# Patient Record
Sex: FEMALE | Race: OTHER
[De-identification: names, ages, dates, MRNs, and addresses within clinical notes are randomized per-mention and may not be internally consistent; named-entity substitution may affect disease eponyms.]

---

## 2019-12-06 ENCOUNTER — HOSPITAL ENCOUNTER (EMERGENCY)
Dept: HOSPITAL 72 - EMR | Age: 34
Discharge: HOME | End: 2019-12-06
Payer: MEDICAID

## 2019-12-06 VITALS — BODY MASS INDEX: 23.92 KG/M2 | WEIGHT: 135 LBS | HEIGHT: 63 IN

## 2019-12-06 VITALS — SYSTOLIC BLOOD PRESSURE: 120 MMHG | DIASTOLIC BLOOD PRESSURE: 80 MMHG

## 2019-12-06 VITALS — DIASTOLIC BLOOD PRESSURE: 68 MMHG | SYSTOLIC BLOOD PRESSURE: 116 MMHG

## 2019-12-06 DIAGNOSIS — S30.0XXA: Primary | ICD-10-CM
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
## 2019-12-06 DIAGNOSIS — S39.012A: ICD-10-CM
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
## 2019-12-06 DIAGNOSIS — S06.0X0A: ICD-10-CM

## 2019-12-06 LAB
APPEARANCE UR: (no result)
APTT PPP: (no result) S
GLUCOSE UR STRIP-MCNC: NEGATIVE MG/DL
KETONES UR QL STRIP: NEGATIVE
LEUKOCYTE ESTERASE UR QL STRIP: (no result)
NITRITE UR QL STRIP: NEGATIVE
PH UR STRIP: 6 [PH] (ref 4.5–8)
PROT UR QL STRIP: NEGATIVE
SP GR UR STRIP: 1.01 (ref 1–1.03)
UROBILINOGEN UR-MCNC: NORMAL MG/DL (ref 0–1)

## 2019-12-06 PROCEDURE — 99283 EMERGENCY DEPT VISIT LOW MDM: CPT

## 2019-12-06 PROCEDURE — 81025 URINE PREGNANCY TEST: CPT

## 2019-12-06 PROCEDURE — 87086 URINE CULTURE/COLONY COUNT: CPT

## 2019-12-06 PROCEDURE — 81003 URINALYSIS AUTO W/O SCOPE: CPT

## 2019-12-06 PROCEDURE — 96372 THER/PROPH/DIAG INJ SC/IM: CPT
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
## 2019-12-06 NOTE — NUR
ED Nurse Note:



Patient states she is not pregnant at this time and ok to receive Toradol 
injection.
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
## 2019-12-06 NOTE — NUR
ED Nurse Note:





pt is cleared to be d/c per ERMD, pt discharge and aftercare instruction 
provided w/ prescription, pt education done via discussion and handout, pt 
advised to follow up with pcp or return to ed if changes in condition, vss, 
ambulatory w/steady gait, pt verbalized understanding, pt accompanied by family 
left w/ all belongigns id band removed.